# Patient Record
Sex: FEMALE | Race: WHITE | NOT HISPANIC OR LATINO | URBAN - METROPOLITAN AREA
[De-identification: names, ages, dates, MRNs, and addresses within clinical notes are randomized per-mention and may not be internally consistent; named-entity substitution may affect disease eponyms.]

---

## 2019-04-29 ENCOUNTER — TRANSFERRED RECORDS (OUTPATIENT)
Dept: HEALTH INFORMATION MANAGEMENT | Facility: CLINIC | Age: 74
End: 2019-04-29

## 2019-05-16 ENCOUNTER — TRANSFERRED RECORDS (OUTPATIENT)
Dept: HEALTH INFORMATION MANAGEMENT | Facility: CLINIC | Age: 74
End: 2019-05-16

## 2019-05-24 ENCOUNTER — TRANSFERRED RECORDS (OUTPATIENT)
Dept: HEALTH INFORMATION MANAGEMENT | Facility: CLINIC | Age: 74
End: 2019-05-24

## 2019-06-06 ENCOUNTER — TRANSFERRED RECORDS (OUTPATIENT)
Dept: HEALTH INFORMATION MANAGEMENT | Facility: CLINIC | Age: 74
End: 2019-06-06

## 2019-07-08 ENCOUNTER — TRANSFERRED RECORDS (OUTPATIENT)
Dept: HEALTH INFORMATION MANAGEMENT | Facility: CLINIC | Age: 74
End: 2019-07-08

## 2019-07-25 ENCOUNTER — OFFICE VISIT (OUTPATIENT)
Dept: RADIATION THERAPY | Facility: OUTPATIENT CENTER | Age: 74
End: 2019-07-25
Payer: COMMERCIAL

## 2019-07-25 VITALS
HEART RATE: 61 BPM | DIASTOLIC BLOOD PRESSURE: 78 MMHG | RESPIRATION RATE: 16 BRPM | SYSTOLIC BLOOD PRESSURE: 161 MMHG | OXYGEN SATURATION: 100 % | WEIGHT: 181.6 LBS

## 2019-07-25 DIAGNOSIS — D05.11 DUCTAL CARCINOMA IN SITU (DCIS) OF RIGHT BREAST: Primary | ICD-10-CM

## 2019-07-25 PROBLEM — R76.11 MANTOUX: POSITIVE: Status: ACTIVE | Noted: 2019-07-25

## 2019-07-25 PROBLEM — I10 HYPERTENSION: Status: ACTIVE | Noted: 2019-07-25

## 2019-07-25 PROBLEM — M17.12 PRIMARY OSTEOARTHRITIS OF LEFT KNEE: Status: ACTIVE | Noted: 2018-06-05

## 2019-07-25 PROBLEM — R01.1 SYSTOLIC MURMUR: Status: ACTIVE | Noted: 2017-02-14

## 2019-07-25 PROBLEM — M54.17 RIGHT LUMBOSACRAL RADICULOPATHY: Status: ACTIVE | Noted: 2018-11-07

## 2019-07-25 PROBLEM — M77.8 THUMB TENDONITIS: Status: ACTIVE | Noted: 2018-04-26

## 2019-07-25 RX ORDER — CALCIPOTRIENE 50 UG/G
CREAM TOPICAL
COMMUNITY
Start: 2012-07-30

## 2019-07-25 RX ORDER — ANASTROZOLE 1 MG/1
TABLET ORAL DAILY
COMMUNITY
Start: 2018-10-23

## 2019-07-25 RX ORDER — LOSARTAN POTASSIUM 50 MG/1
50 TABLET ORAL DAILY
COMMUNITY
Start: 2018-11-19

## 2019-07-25 ASSESSMENT — PAIN SCALES - GENERAL: PAINLEVEL: NO PAIN (0)

## 2019-07-25 NOTE — NURSING NOTE
REASON FOR APPOINTMENT   Newly diagnosed right breast DCIS. S/p lumpectomy. No chemotherapy indicated.  Already on arimidex for left breast cancer treatment in 2017. See [facility]/care everywhere.    PERSONAL HISTORY OF CANCER   Previous Cancer ? Left breast cancer tx 2017: Lumpectomy + RT + arimidex   Prior Radiation ? 21 fractions performed at [place] in [place] 2017. Records received.     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 Yes    REVIEW OF SYSTEMS  Skin: breast incision healing  Eyes: glasses  Ears/Nose/Throat: negative  Respiratory: No shortness of breath, dyspnea on exertion, cough, or hemoptysis  Cardiovascular: negative  Gastrointestinal: negative  Genitourinary: incontinence, nocturia  Musculoskeletal: left knee pain/bakers cyst acts up at times  Neurologic: negative  Psychiatric: negative  Hematologic/Lymphatic/Immunologic: negative  Endocrine: negative    WOMEN ONLY  Any chance you may be pregnant: No    Radiation Oncology Patient Teaching    Person involved with teaching: Patient  Patient asked Questions: Yes  Patient was cooperative: Yes  Patient was receptive (willing to accept information given): Yes    Education Assessment  Comprehension ability: High  Knowledge level: High  Factors affecting teaching: None    Education Materials Given  Radiation Therapy and You  Caring for Your Skin During Radiation ...  Eating Hints  Diet and Nutrition Information    Educational Topics Discussed  Side effects, Medications, Activity and Nutrition    Response To Teaching  Verbalizes understanding    GYN Only  Vaginal Dilator-given and educated:  N/A    Do you have an advanced directive or living will? yes  Are you DNR/DNI? no

## 2019-07-25 NOTE — PROGRESS NOTES
Department of Radiation Oncology  Radiation Therapy Center  [facility]  [address], [address]  [place] [address]  [phone]       Consultation Note    Name: Arlin Hutchins MRN: [id number]   : 1945   Date of Service: 2019  Referring: Dr. Moody      Reason for consultation: History of left breast ductal carcinoma in situ status post breast conservation therapy now presenting with right breast ductal carcinoma in situ status post lumpectomy.  Final pathology demonstrated DCIS, grade 3, 2.5 cm in size, close anterior and medial margin (1 mm), ER negative, pathologic TisN0.  Evaluate role for adjuvant radiation therapy.    History of Present Illness   Ms. Hutchins is a 73 year old female a diagnosis of left breast ductal carcinoma in situ status post breast conservation therapy now presenting with right breast ductal carcinoma in situ status post lumpectomy.  Final pathology demonstrated DCIS, grade 3, 2.5 cm in size, close anterior and medial margin (1 mm), ER negative, pathologic TisN0.  She presents today to discuss potential role of adjuvant whole breast radiation therapy.    The patient's history dates to  when she underwent lumpectomy of the left breast diagnosed ductal carcinoma in situ.  On 2017 final path from left breast lumpectomy demonstrated DCIS, grade 2, negative margins, no invasive disease, estrogen receptor positive.  The patient subsequent completed adjuvant whole breast radiation therapy under the care of Dr. Schumacher.  She received a total dose of 4256 cGy followed by 1000 cGy boost to the lumpectomy bed.  Treatment was completed on 2017.  The patient was seen by Dr. Monaco of medical oncology who continued anti-endocrine therapy with Arimidex.  More recently the patient underwent mammogram which demonstrated calcifications in the right breast prompting further evaluation.  On 2019 left breast biopsy of the area of calcifications at the  12 o'clock position, 5 cm from the nipple demonstrated DCIS, grade 3, ER negative.  Biopsy of an additional site at the 12 o'clock position, 9 cm from the nipple demonstrated DCIS grade 3, no invasive disease.  On 6/27/2019 the patient underwent right breast lumpectomy by Dr. Moody.  Final pathology demonstrated DCIS, grade 3, 2.5 cm in size, close anterior/medial margin (1 mm), ER negative, no invasive disease, pathologic Tis N0.  Reeexcision was discuss, but ultimately the patient chose not to undergo re-excision. The patient subsequently presents today to discuss the potential role of whole breast radiation therapy.      She is overall doing well.  She has healed well from surgery.  No issues with range of motion.  No issues with lymphedema.  No pacemaker.  She does have a previous history of left breast radiation as described.  No history of connective tissue disorder.  The patient states that she overall tolerated her previous course of adjuvant whole breast radiation very well.  She has continued on Arimidex under the care of Dr. Monaco.      Past Medical History:   Past Medical History:   Diagnosis Date     Cancer (H)      Hypertension        Past Surgical History:   Past Surgical History:   Procedure Laterality Date     AS TOTAL ABDOM HYSTERECTOMY      SANDY/BSO     BREAST SURGERY      left lumpectomy 2017     LITHOTRIPSY       TONSILLECTOMY         Chemotherapy History:  none    Radiation History:  3/22/17 to 4/13/17: Left whole breast radiation therapy. 4256 cGy in 16 fractions followed by 1000 cGy in 5 fraction boost. Dr. Schumacher.     Pregnant: No  Implanted Cardiac Devices: No    Medications:  Current Outpatient Medications   Medication     anastrozole (ARIMIDEX) 1 MG tablet     calcipotriene (DOVONEX) 0.005 % external cream     hydrocortisone (ANUSOL-HC) 2.5 % cream     losartan (COZAAR) 50 MG tablet     No current facility-administered medications for this visit.          Allergies:     Allergies    Allergen Reactions     Penicillins Anaphylaxis     Throat closes     Diatrizoate Hives     Hydrochlorothiazide Other (See Comments)     Severe headache on 25mg tab hydrochlorothiazide      Shellfish Allergy Nausea     Clams only     Sulfa Drugs Rash         Family History:  No family history on file.    Review of Systems   A 10-point review of systems was performed. Pertinent findings are noted in the HPI.    Physical Exam   ECOG Status: 1    Vitals:  There were no vitals taken for this visit.    Gen: Alert, in NAD  Head: NC/AT  Eyes: PERRL, EOMI, sclera anicteric  Ears: No external auricular lesions  Nose/sinus: No rhinorrhea or epistaxis  Oral cavity/oropharynx: MMM, no visible oral cavity lesions, FOM and BOT are soft to palpation  Neck: Full ROM, supple, no palpable adenopathy  Pulm: No wheezing, stridor or respiratory distress  CV: Extremities are warm and well-perfused, no cyanosis, no pedal edema  Abdominal: Normal bowel sounds, soft, nontender, no masses  Musculoskeletal: Normal bulk and tone  Skin: Normal color and turgor  Neuro: A/Ox3, CN II-XII intact, normal gait    Imaging/Path/Labs   Imaging:   Per HPI    Path:   2/27/17  Specimens:   A) - Left Breast Lump, Left breast lump, Blue is anterior , Orange is superior                      B) - Left Breast Lump, Medial margin,Green is new margin                                            C) - Left Breast Lump, New posterior margin                                               Final Diagnosis A) LEFT BREAST, WIRE LOCALIZATION LUMPECTOMY WITH MARGINS EVALUATION:  1. Ductal carcinoma in situ (DCIS), cribriform type, nuclear grade 2     a. DCIS is present in a single duct (measuring less than 0.2 cm); see comment     b. DCIS is located 0.2 cm from the closest medial margin (for final        Medial margin see specimen B)  2. Prior core biopsy site changes   3. Portion of benign skin   4. Negative for invasive carcinoma    B) LEFT BREAST, NEW MEDIAL MARGIN,  EXCISION:  1. Proliferative fibrocystic change including intraductal papillomas  2. Negative for invasive carcinoma    C) LEFT BREAST, NEW POSTERIOR MARGIN, EXCISION:  1. Atypical lobular hyperplasia    2. Proliferative fibrocystic change including intraductal papillomas  3. Negative for invasive carcinoma           6/27/19  A) RIGHT BREAST, LUMPECTOMY:   1. Ductal carcinoma in situ (DCIS), solid and cribriform type, nuclear grade 3, with punctate necrosis    a. DCIS measures 2.5 cm (present in 5 of 11 consecutive tissue slices)   b. DCIS is focally located 0.1 cm from anterior/medial margin    c. Previous core biopsy site changes (2 biopsy clips identified)  2. Breast Ancillary Testing: Performed on prior case ([id number])      a. Estrogen receptor: Negative (by manual morphometry)   3. Negative invasive carcinoma        Assessment    Ms. Hutchins is a 73 year old female a diagnosis of left breast ductal carcinoma in situ status post breast conservation therapy now presenting with right breast ductal carcinoma in situ status post lumpectomy.  Final pathology demonstrated DCIS, grade 3, 2.5 cm in size, close anterior and medial margin (1 mm), ER negative, pathologic TisN0.  She presents today to discuss potential role of adjuvant right whole breast radiation therapy.    Plan   Today, we discussed that in general, adjuvant whole breast radiotherapy is recommended as it has been demonstrated to reduce tumor recurrence by 50%, which has been demonstrated in a  meta-analysis of DCIS patients treated with radiation over lumpectomy alone, despite no survival benefit  detected (EBCTCG, JNCI, 2010).  However, we also discussed that there is clinical evidence suggesting that highly selected patients with small, low to intermediate grade DCIS lesions can be offered observation after lumpectomy rather than radiation. In a prospective, randomized study by the ECOG, patients with low/intermediate grade DCIS, median tumor size 6mm,  resected with margins >3mm had ipsilateral breast tumor recurrence rates of 6% at 5 years (Miguel, JCO, 2009). This is approximately a 1% recurrence rate per year. Another study RTOG 9804, also showed recurrence rates of approximately 1% per year without radiation in patients who had low risk DCIS (Jaclyn et al, JCO, 2015).  Though radiation did significantly reduce recurrence rates (greater than 50% relative reduction), the absolute benefit was small, decreasing from 6.4% to 0.9% at 7 years.      However, we discussed that Mrs. Hutchins would likely have a higher risk of LR given her high grade histology, which confers about a 3% risk per year of local recurrence. Additionally, she has a close surgical margin anteriorly and medially. Given these findings, I would recommend adjuvant whole breast radiation therapy. I would plan to treat to a [place] dose [place] 55962 cGy in 15 fractions followed by 1000 cGy in 5 fraction boost to the lumpectomy bed.     Today we also discussed the logistics of treatment planning and delivery in detail with the patient. We also discussed side effects, including short term risks of fatigue and skin reaction, and long term risks of pneumonitis, lung fibrosis, soft tissue fibrosis, skin changes, breast contour changes, rib fractures, cardiac damage, secondary cancers, lymphedema, and thyroid dysfunction. We described the use of 3D-conformal radiotherapy to minimize dose to the normal tissues, while adequately covering the target tissues, and the ability of this technique to decrease potential for toxicity.    The patient has elected to pursue radiation therapy. Consent was obtained. We will schedule the patient for CT simulation with plan to begin treatment about 7-10 days thereafter.     Scotty Lenz MD  Department of Radiation Oncology  [facility]

## 2019-07-25 NOTE — LETTER
2019      RE: Arlin Hutchins  [address] [address] [address]          Department of Radiation Oncology  Radiation Therapy Center  [facility]  [address], [address]  [place] [address]  [phone]       Consultation Note    Name: Arlin Hutchins MRN: [id number]   : 1945   Date of Service: 2019  Referring: Dr. Moody      Reason for consultation: History of left breast ductal carcinoma in situ status post breast conservation therapy now presenting with right breast ductal carcinoma in situ status post lumpectomy.  Final pathology demonstrated DCIS, grade 3, 2.5 cm in size, close anterior and medial margin (1 mm), ER negative, pathologic TisN0.  Evaluate role for adjuvant radiation therapy.    History of Present Illness   Ms. Hutchins is a 73 year old female a diagnosis of left breast ductal carcinoma in situ status post breast conservation therapy now presenting with right breast ductal carcinoma in situ status post lumpectomy.  Final pathology demonstrated DCIS, grade 3, 2.5 cm in size, close anterior and medial margin (1 mm), ER negative, pathologic TisN0.  She presents today to discuss potential role of adjuvant whole breast radiation therapy.    The patient's history dates to  when she underwent lumpectomy of the left breast diagnosed ductal carcinoma in situ.  On 2017 final path from left breast lumpectomy demonstrated DCIS, grade 2, negative margins, no invasive disease, estrogen receptor positive.  The patient subsequent completed adjuvant whole breast radiation therapy under the care of Dr. Schumacher.  She received a total dose of 4256 cGy followed by 1000 cGy boost to the lumpectomy bed.  Treatment was completed on 2017.  The patient was seen by Dr. Monaco of medical oncology who continued anti-endocrine therapy with Arimidex.  More recently the patient underwent mammogram which demonstrated calcifications in the right breast prompting further  evaluation.  On 5/16/2019 left breast biopsy of the area of calcifications at the 12 o'clock position, 5 cm from the nipple demonstrated DCIS, grade 3, ER negative.  Biopsy of an additional site at the 12 o'clock position, 9 cm from the nipple demonstrated DCIS grade 3, no invasive disease.  On 6/27/2019 the patient underwent right breast lumpectomy by Dr. Moody.  Final pathology demonstrated DCIS, grade 3, 2.5 cm in size, close anterior/medial margin (1 mm), ER negative, no invasive disease, pathologic Tis N0.  Reeexcision was discuss, but ultimately the patient chose not to undergo re-excision. The patient subsequently presents today to discuss the potential role of whole breast radiation therapy.      She is overall doing well.  She has healed well from surgery.  No issues with range of motion.  No issues with lymphedema.  No pacemaker.  She does have a previous history of left breast radiation as described.  No history of connective tissue disorder.  The patient states that she overall tolerated her previous course of adjuvant whole breast radiation very well.  She has continued on Arimidex under the care of Dr. Monaco.      Past Medical History:   Past Medical History:   Diagnosis Date     Cancer (H)      Hypertension        Past Surgical History:   Past Surgical History:   Procedure Laterality Date     AS TOTAL ABDOM HYSTERECTOMY      SANDY/BSO     BREAST SURGERY      left lumpectomy 2017     LITHOTRIPSY       TONSILLECTOMY         Chemotherapy History:  none    Radiation History:  3/22/17 to 4/13/17: Left whole breast radiation therapy. 4256 cGy in 16 fractions followed by 1000 cGy in 5 fraction boost. Dr. Schumacher.     Pregnant: No  Implanted Cardiac Devices: No    Medications:  Current Outpatient Medications   Medication     anastrozole (ARIMIDEX) 1 MG tablet     calcipotriene (DOVONEX) 0.005 % external cream     hydrocortisone (ANUSOL-HC) 2.5 % cream     losartan (COZAAR) 50 MG tablet     No current  facility-administered medications for this visit.          Allergies:     Allergies   Allergen Reactions     Penicillins Anaphylaxis     Throat closes     Diatrizoate Hives     Hydrochlorothiazide Other (See Comments)     Severe headache on 25mg tab hydrochlorothiazide      Shellfish Allergy Nausea     Clams only     Sulfa Drugs Rash         Family History:  No family history on file.    Review of Systems   A 10-point review of systems was performed. Pertinent findings are noted in the HPI.    Physical Exam   ECOG Status: 1    Vitals:  There were no vitals taken for this visit.    Gen: Alert, in NAD  Head: NC/AT  Eyes: PERRL, EOMI, sclera anicteric  Ears: No external auricular lesions  Nose/sinus: No rhinorrhea or epistaxis  Oral cavity/oropharynx: MMM, no visible oral cavity lesions, FOM and BOT are soft to palpation  Neck: Full ROM, supple, no palpable adenopathy  Pulm: No wheezing, stridor or respiratory distress  CV: Extremities are warm and well-perfused, no cyanosis, no pedal edema  Abdominal: Normal bowel sounds, soft, nontender, no masses  Musculoskeletal: Normal bulk and tone  Skin: Normal color and turgor  Neuro: A/Ox3, CN II-XII intact, normal gait    Imaging/Path/Labs   Imaging:   Per HPI    Path:   2/27/17  Specimens:   A) - Left Breast Lump, Left breast lump, Blue is anterior , Orange is superior                      B) - Left Breast Lump, Medial margin,Green is new margin                                            C) - Left Breast Lump, New posterior margin                                               Final Diagnosis A) LEFT BREAST, WIRE LOCALIZATION LUMPECTOMY WITH MARGINS EVALUATION:  1. Ductal carcinoma in situ (DCIS), cribriform type, nuclear grade 2     a. DCIS is present in a single duct (measuring less than 0.2 cm); see comment     b. DCIS is located 0.2 cm from the closest medial margin (for final        Medial margin see specimen B)  2. Prior core biopsy site changes   3. Portion of benign  skin   4. Negative for invasive carcinoma    B) LEFT BREAST, NEW MEDIAL MARGIN, EXCISION:  1. Proliferative fibrocystic change including intraductal papillomas  2. Negative for invasive carcinoma    C) LEFT BREAST, NEW POSTERIOR MARGIN, EXCISION:  1. Atypical lobular hyperplasia    2. Proliferative fibrocystic change including intraductal papillomas  3. Negative for invasive carcinoma           6/27/19  A) RIGHT BREAST, LUMPECTOMY:   1. Ductal carcinoma in situ (DCIS), solid and cribriform type, nuclear grade 3, with punctate necrosis    a. DCIS measures 2.5 cm (present in 5 of 11 consecutive tissue slices)   b. DCIS is focally located 0.1 cm from anterior/medial margin    c. Previous core biopsy site changes (2 biopsy clips identified)  2. Breast Ancillary Testing: Performed on prior case ([id number])      a. Estrogen receptor: Negative (by manual morphometry)   3. Negative invasive carcinoma        Assessment    Ms. Hutchins is a 73 year old female a diagnosis of left breast ductal carcinoma in situ status post breast conservation therapy now presenting with right breast ductal carcinoma in situ status post lumpectomy.  Final pathology demonstrated DCIS, grade 3, 2.5 cm in size, close anterior and medial margin (1 mm), ER negative, pathologic TisN0.  She presents today to discuss potential role of adjuvant right whole breast radiation therapy.    Plan   Today, we discussed that in general, adjuvant whole breast radiotherapy is recommended as it has been demonstrated to reduce tumor recurrence by 50%, which has been demonstrated in a  meta-analysis of DCIS patients treated with radiation over lumpectomy alone, despite no survival benefit  detected (EBCTCG, JNCI, 2010).  However, we also discussed that there is clinical evidence suggesting that highly selected patients with small, low to intermediate grade DCIS lesions can be offered observation after lumpectomy rather than radiation. In a prospective, randomized study  by the ECOG, patients with low/intermediate grade DCIS, median tumor size 6mm, resected with margins >3mm had ipsilateral breast tumor recurrence rates of 6% at 5 years (Miguel, JCO, 2009). This is approximately a 1% recurrence rate per year. Another study RTOG 9804, also showed recurrence rates of approximately 1% per year without radiation in patients who had low risk DCIS (Jaclyn et al, JCO, 2015).  Though radiation did significantly reduce recurrence rates (greater than 50% relative reduction), the absolute benefit was small, decreasing from 6.4% to 0.9% at 7 years.      However, we discussed that Mrs. Hutchins would likely have a higher risk of LR given her high grade histology, which confers about a 3% risk per year of local recurrence. Additionally, she has a close surgical margin anteriorly and medially. Given these findings, I would recommend adjuvant whole breast radiation therapy. I would plan to treat to a [place] 31233 cGy in 15 fractions followed by 1000 cGy in 5 fraction boost to the lumpectomy bed.     Today we also discussed the logistics of treatment planning and delivery in detail with the patient. We also discussed side effects, including short term risks of fatigue and skin reaction, and long term risks of pneumonitis, lung fibrosis, soft tissue fibrosis, skin changes, breast contour changes, rib fractures, cardiac damage, secondary cancers, lymphedema, and thyroid dysfunction. We described the use of 3D-conformal radiotherapy to minimize dose to the normal tissues, while adequately covering the target tissues, and the ability of this technique to decrease potential for toxicity.    The patient has elected to pursue radiation therapy. Consent was obtained. We will schedule the patient for CT simulation with plan to begin treatment about 7-10 days thereafter.     Scotty Lenz MD  Department of Radiation Oncology  [facility]

## 2019-08-01 ENCOUNTER — APPOINTMENT (OUTPATIENT)
Dept: RADIATION THERAPY | Facility: OUTPATIENT CENTER | Age: 74
End: 2019-08-01
Payer: COMMERCIAL

## 2019-08-08 ENCOUNTER — APPOINTMENT (OUTPATIENT)
Dept: RADIATION THERAPY | Facility: OUTPATIENT CENTER | Age: 74
End: 2019-08-08
Payer: COMMERCIAL

## 2019-08-09 ENCOUNTER — APPOINTMENT (OUTPATIENT)
Dept: RADIATION THERAPY | Facility: OUTPATIENT CENTER | Age: 74
End: 2019-08-09
Payer: COMMERCIAL

## 2019-08-12 ENCOUNTER — APPOINTMENT (OUTPATIENT)
Dept: RADIATION THERAPY | Facility: OUTPATIENT CENTER | Age: 74
End: 2019-08-12
Payer: COMMERCIAL

## 2019-08-13 ENCOUNTER — APPOINTMENT (OUTPATIENT)
Dept: RADIATION THERAPY | Facility: OUTPATIENT CENTER | Age: 74
End: 2019-08-13
Payer: COMMERCIAL

## 2019-08-14 ENCOUNTER — OFFICE VISIT (OUTPATIENT)
Dept: RADIATION THERAPY | Facility: OUTPATIENT CENTER | Age: 74
End: 2019-08-14
Payer: COMMERCIAL

## 2019-08-14 ENCOUNTER — APPOINTMENT (OUTPATIENT)
Dept: RADIATION THERAPY | Facility: OUTPATIENT CENTER | Age: 74
End: 2019-08-14
Payer: COMMERCIAL

## 2019-08-14 VITALS
RESPIRATION RATE: 18 BRPM | WEIGHT: 179.4 LBS | SYSTOLIC BLOOD PRESSURE: 145 MMHG | HEART RATE: 59 BPM | OXYGEN SATURATION: 99 % | DIASTOLIC BLOOD PRESSURE: 72 MMHG

## 2019-08-14 DIAGNOSIS — D05.11 DUCTAL CARCINOMA IN SITU (DCIS) OF RIGHT BREAST: Primary | ICD-10-CM

## 2019-08-14 ASSESSMENT — PAIN SCALES - GENERAL: PAINLEVEL: NO PAIN (0)

## 2019-08-14 NOTE — LETTER
2019      RE: Arlin Hutchins  [address] [address] [address]       [facility]  SPECIALIZING IN BREAKTHROUGHS  Radiation Oncology    On Treatment Visit Note      Arlin Hutchins      Date: 2019   MRN: [id number]   : 1945         Reason for Visit:  On Radiation Treatment Visit     Treatment Summary to Date   Right breast   1335/ 5005 cGy             Subjective:   Doing well. No acute complaints. Using skin creams. No breast pain. No pruritus.     Nursing ROS:           Objective:   There were no vitals taken for this visit.   No skin erythema or desquamation.     Labs:  CBC RESULTS: No results for input(s): WBC, RBC, HGB, HCT, MCV, MCH, MCHC, RDW, PLT in the last 41271 hours.  ELECTROLYTES:  No results for input(s): NA, POTASSIUM, CHLORIDE, WILY, CO2, BUN, CR, GLC in the last 79354 hours.    Assessment:  Ms. Hutchins is a 73 year old female a diagnosis of left breast ductal carcinoma in situ status post breast conservation therapy now presenting with right breast ductal carcinoma in situ status post lumpectomy.  Final pathology demonstrated DCIS, grade 3, 2.5 cm in size, close anterior and medial margin (1 mm), ER negative, pathologic TisN0.  She  is undergoing adjuvant right whole breast radiation therapy.    Tolerating radiation therapy well.  All questions and concerns addressed.    Plan:   1. Continue current therapy.    2. Continue skin care.   3. Ibuprofen PRN beast heaviness/pain. Neosporin with pain relief PRN pain. Hydrocortisone PRN pruritus.       Mosaiq chart and setup information reviewed  Ports checked         Scotty Lenz MD

## 2019-08-14 NOTE — PROGRESS NOTES
[facility] PHYSICIANS  SPECIALIZING IN BREAKTHROUGHS  Radiation Oncology    On Treatment Visit Note      Arlin Hutchins      Date: 2019   MRN: [id number]   : 1945         Reason for Visit:  On Radiation Treatment Visit     Treatment Summary to Date   Right breast   1335/ 5005 cGy             Subjective:   Doing well. No acute complaints. Using skin creams. No breast pain. No pruritus.     Nursing ROS:           Objective:   There were no vitals taken for this visit.   No skin erythema or desquamation.     Labs:  CBC RESULTS: No results for input(s): WBC, RBC, HGB, HCT, MCV, MCH, MCHC, RDW, PLT in the last 74835 hours.  ELECTROLYTES:  No results for input(s): NA, POTASSIUM, CHLORIDE, WILY, CO2, BUN, CR, GLC in the last 39197 hours.    Assessment:  Ms. Hutchins is a 73 year old female a diagnosis of left breast ductal carcinoma in situ status post breast conservation therapy now presenting with right breast ductal carcinoma in situ status post lumpectomy.  Final pathology demonstrated DCIS, grade 3, 2.5 cm in size, close anterior and medial margin (1 mm), ER negative, pathologic TisN0.  She is undergoing adjuvant right whole breast radiation therapy.    Tolerating radiation therapy well.  All questions and concerns addressed.    Plan:   1. Continue current therapy.    2. Continue skin care.   3. Ibuprofen PRN beast heaviness/pain. Neosporin with pain relief PRN pain. Hydrocortisone PRN pruritus.       drchrono chart and setup information reviewed  Ports checked                Scotty Lenz MD

## 2019-08-15 ENCOUNTER — APPOINTMENT (OUTPATIENT)
Dept: RADIATION THERAPY | Facility: OUTPATIENT CENTER | Age: 74
End: 2019-08-15
Payer: COMMERCIAL

## 2019-08-16 ENCOUNTER — APPOINTMENT (OUTPATIENT)
Dept: RADIATION THERAPY | Facility: OUTPATIENT CENTER | Age: 74
End: 2019-08-16
Payer: COMMERCIAL

## 2019-08-20 ENCOUNTER — APPOINTMENT (OUTPATIENT)
Dept: RADIATION THERAPY | Facility: OUTPATIENT CENTER | Age: 74
End: 2019-08-20
Payer: COMMERCIAL

## 2019-08-21 ENCOUNTER — OFFICE VISIT (OUTPATIENT)
Dept: RADIATION THERAPY | Facility: OUTPATIENT CENTER | Age: 74
End: 2019-08-21
Payer: COMMERCIAL

## 2019-08-21 ENCOUNTER — APPOINTMENT (OUTPATIENT)
Dept: RADIATION THERAPY | Facility: OUTPATIENT CENTER | Age: 74
End: 2019-08-21
Payer: COMMERCIAL

## 2019-08-21 VITALS
DIASTOLIC BLOOD PRESSURE: 78 MMHG | SYSTOLIC BLOOD PRESSURE: 168 MMHG | OXYGEN SATURATION: 98 % | HEART RATE: 68 BPM | RESPIRATION RATE: 18 BRPM | WEIGHT: 180.6 LBS

## 2019-08-21 DIAGNOSIS — D05.11 DUCTAL CARCINOMA IN SITU (DCIS) OF RIGHT BREAST: Primary | ICD-10-CM

## 2019-08-21 NOTE — PROGRESS NOTES
[facility] PHYSICIANS  SPECIALIZING IN BREAKTHROUGHS  Radiation Oncology    On Treatment Visit Note      Arlin Hutchins      Date: 2019   MRN: [id number]   : 1945         Reason for Visit:  On Radiation Treatment Visit     Treatment Summary to Date   Right breast   2403/ 5005 cGy             Subjective:   Doing well. No acute complaints. Using skin creams. No breast pain. No pruritus.     Nursing ROS:           Objective:   There were no vitals taken for this visit.   Very mild skin erythema without desquamation.     Labs:  CBC RESULTS: No results for input(s): WBC, RBC, HGB, HCT, MCV, MCH, MCHC, RDW, PLT in the last 59314 hours.  ELECTROLYTES:  No results for input(s): NA, POTASSIUM, CHLORIDE, WILY, CO2, BUN, CR, GLC in the last 63119 hours.    Assessment:  Ms. Hutchins is a 73 year old female a diagnosis of left breast ductal carcinoma in situ status post breast conservation therapy now presenting with right breast ductal carcinoma in situ status post lumpectomy.  Final pathology demonstrated DCIS, grade 3, 2.5 cm in size, close anterior and medial margin (1 mm), ER negative, pathologic TisN0.  She is undergoing adjuvant right whole breast radiation therapy.    Tolerating radiation therapy well.  All questions and concerns addressed.    Plan:   1. Continue current therapy.    2. Continue skin care.   3. Ibuprofen PRN beast heaviness/pain. Neosporin with pain relief PRN pain. Hydrocortisone PRN pruritus.       Mosaiq chart and setup information reviewed  Ports checked                Scotty Lenz MD

## 2019-08-21 NOTE — LETTER
2019      RE: Arlin Hutchins  [address] [address] [address]       [facility] PHYSICIANS  SPECIALIZING IN BREAKTHROUGHS  Radiation Oncology    On Treatment Visit Note      Arlin Hutchins      Date: 2019   MRN: [id number]   : 1945         Reason for Visit:  On Radiation Treatment Visit     Treatment Summary to Date   Right breast   2403/ 5005 cGy             Subjective:   Doing well. No acute complaints. Using skin creams. No breast pain. No pruritus.     Nursing ROS:           Objective:   There were no vitals taken for this visit.   Very mild skin erythema without desquamation.     Labs:  CBC RESULTS: No results for input(s): WBC, RBC, HGB, HCT, MCV, MCH, MCHC, RDW, PLT in the last 71028 hours.  ELECTROLYTES:  No results for input(s): NA, POTASSIUM, CHLORIDE, WILY, CO2, BUN, CR, GLC in the last 89703 hours.    Assessment:  Ms. Hutchins is a 73 year old female a diagnosis of left breast ductal carcinoma in situ status post breast conservation therapy now presenting with right breast ductal carcinoma in situ status post lumpectomy.  Final pathology demonstrated DCIS, grade 3, 2.5 cm in size, close anterior and medial margin (1 mm), ER negative, pathologic TisN0.  She is undergoing adjuvant right whole breast radiation therapy.    Tolerating radiation therapy well.  All questions and concerns addressed.    Plan:   1. Continue current therapy.    2. Continue skin care.   3. Ibuprofen PRN beast heaviness/pain. Neosporin with pain relief PRN pain. Hydrocortisone PRN pruritus.       Mosaiq chart and setup information reviewed  Ports checked                MD Scotty Gomez MD

## 2019-08-22 ENCOUNTER — APPOINTMENT (OUTPATIENT)
Dept: RADIATION THERAPY | Facility: OUTPATIENT CENTER | Age: 74
End: 2019-08-22
Payer: COMMERCIAL

## 2019-08-23 ENCOUNTER — APPOINTMENT (OUTPATIENT)
Dept: RADIATION THERAPY | Facility: OUTPATIENT CENTER | Age: 74
End: 2019-08-23
Payer: COMMERCIAL

## 2019-08-26 ENCOUNTER — APPOINTMENT (OUTPATIENT)
Dept: RADIATION THERAPY | Facility: OUTPATIENT CENTER | Age: 74
End: 2019-08-26
Payer: COMMERCIAL

## 2019-08-27 ENCOUNTER — APPOINTMENT (OUTPATIENT)
Dept: RADIATION THERAPY | Facility: OUTPATIENT CENTER | Age: 74
End: 2019-08-27
Payer: COMMERCIAL

## 2019-08-28 ENCOUNTER — OFFICE VISIT (OUTPATIENT)
Dept: RADIATION THERAPY | Facility: OUTPATIENT CENTER | Age: 74
End: 2019-08-28
Payer: COMMERCIAL

## 2019-08-28 ENCOUNTER — APPOINTMENT (OUTPATIENT)
Dept: RADIATION THERAPY | Facility: OUTPATIENT CENTER | Age: 74
End: 2019-08-28
Payer: COMMERCIAL

## 2019-08-28 VITALS — SYSTOLIC BLOOD PRESSURE: 133 MMHG | DIASTOLIC BLOOD PRESSURE: 79 MMHG | WEIGHT: 178.6 LBS | HEART RATE: 77 BPM

## 2019-08-28 DIAGNOSIS — D05.11 DUCTAL CARCINOMA IN SITU (DCIS) OF RIGHT BREAST: Primary | ICD-10-CM

## 2019-08-28 NOTE — LETTER
2019      RE: Arlin Hutchins  [address] [address] [address]       [facility] PHYSICIANS  SPECIALIZING IN BREAKTHROUGHS  Radiation Oncology    On Treatment Visit Note      Arlin Hutchins      Date: 2019   MRN: [id number]   : 1945         Reason for Visit:  On Radiation Treatment Visit     Treatment Summary to Date   Right breast   3738/ 5005 cGy             Subjective:   Doing well. No acute complaints. Using skin creams. Single episode of breast pain yesterday, since resolved. No pruritus.     Nursing ROS:           Objective:   There were no vitals taken for this visit.   Very mild skin erythema without desquamation.     Labs:  CBC RESULTS: No results for input(s): WBC, RBC, HGB, HCT, MCV, MCH, MCHC, RDW, PLT in the last 00565 hours.  ELECTROLYTES:  No results for input(s): NA, POTASSIUM, CHLORIDE, WILY, CO2, BUN, CR, GLC in the last 83067 hours.    Assessment:  Ms. Hutchins is a 73 year old female a diagnosis of left breast ductal carcinoma in situ status post breast conservation therapy now presenting with right breast ductal carcinoma in situ status post lumpectomy.  Final pathology demonstrated DCIS, grade 3, 2.5 cm in size, close anterior and medial margin (1 mm), ER negative, pathologic TisN0.  She is undergoing adjuvant right whole breast radiation therapy.    Tolerating radiation therapy well.  All questions and concerns addressed.    Plan:   1. Continue current therapy.    2. Continue skin care.   3. Ibuprofen PRN beast heaviness/pain. Neosporin with pain relief PRN pain. Hydrocortisone PRN pruritus.       mGenerator chart and setup information reviewed  Ports checked            Scotty Lenz MD

## 2019-08-28 NOTE — PROGRESS NOTES
[facility] PHYSICIANS  SPECIALIZING IN BREAKTHROUGHS  Radiation Oncology    On Treatment Visit Note      Arlin Hutchins      Date: 2019   MRN: [id number]   : 1945         Reason for Visit:  On Radiation Treatment Visit     Treatment Summary to Date   Right breast   3738/ 5005 cGy             Subjective:   Doing well. No acute complaints. Using skin creams. Single episode of breast pain yesterday, since resolved. No pruritus.     Nursing ROS:           Objective:   There were no vitals taken for this visit.   Very mild skin erythema without desquamation.     Labs:  CBC RESULTS: No results for input(s): WBC, RBC, HGB, HCT, MCV, MCH, MCHC, RDW, PLT in the last 62123 hours.  ELECTROLYTES:  No results for input(s): NA, POTASSIUM, CHLORIDE, WLIY, CO2, BUN, CR, GLC in the last 08923 hours.    Assessment:  Ms. Hutchins is a 73 year old female a diagnosis of left breast ductal carcinoma in situ status post breast conservation therapy now presenting with right breast ductal carcinoma in situ status post lumpectomy.  Final pathology demonstrated DCIS, grade 3, 2.5 cm in size, close anterior and medial margin (1 mm), ER negative, pathologic TisN0.  She is undergoing adjuvant right whole breast radiation therapy.    Tolerating radiation therapy well.  All questions and concerns addressed.    Plan:   1. Continue current therapy.    2. Continue skin care.   3. Ibuprofen PRN beast heaviness/pain. Neosporin with pain relief PRN pain. Hydrocortisone PRN pruritus.       Mosaiq chart and setup information reviewed  Ports checked                Scotty Lenz MD

## 2019-08-29 ENCOUNTER — APPOINTMENT (OUTPATIENT)
Dept: RADIATION THERAPY | Facility: OUTPATIENT CENTER | Age: 74
End: 2019-08-29
Payer: COMMERCIAL

## 2019-08-30 ENCOUNTER — APPOINTMENT (OUTPATIENT)
Dept: RADIATION THERAPY | Facility: OUTPATIENT CENTER | Age: 74
End: 2019-08-30
Payer: COMMERCIAL

## 2019-09-03 ENCOUNTER — APPOINTMENT (OUTPATIENT)
Dept: RADIATION THERAPY | Facility: OUTPATIENT CENTER | Age: 74
End: 2019-09-03
Payer: COMMERCIAL

## 2019-09-04 ENCOUNTER — APPOINTMENT (OUTPATIENT)
Dept: RADIATION THERAPY | Facility: OUTPATIENT CENTER | Age: 74
End: 2019-09-04
Payer: COMMERCIAL

## 2019-09-04 ENCOUNTER — OFFICE VISIT (OUTPATIENT)
Dept: RADIATION THERAPY | Facility: OUTPATIENT CENTER | Age: 74
End: 2019-09-04
Payer: COMMERCIAL

## 2019-09-04 VITALS
SYSTOLIC BLOOD PRESSURE: 138 MMHG | HEART RATE: 66 BPM | OXYGEN SATURATION: 100 % | DIASTOLIC BLOOD PRESSURE: 77 MMHG | RESPIRATION RATE: 16 BRPM | WEIGHT: 178.6 LBS

## 2019-09-04 DIAGNOSIS — D05.11 DUCTAL CARCINOMA IN SITU (DCIS) OF RIGHT BREAST: Primary | ICD-10-CM

## 2019-09-04 ASSESSMENT — PAIN SCALES - GENERAL: PAINLEVEL: NO PAIN (0)

## 2019-09-04 NOTE — LETTER
2019      RE: Arlin Hutchins  [address] [address] [address]       [facility] PHYSICIANS  SPECIALIZING IN BREAKTHROUGHS  Radiation Oncology    On Treatment Visit Note      Arlin Hutchins      Date: 2019   MRN: [id number]   : 1945         Reason for Visit:  On Radiation Treatment Visit     Treatment Summary to Date   Right breast   4605/ 5005 cGy             Subjective:   Doing well. No acute complaints. Using skin creams. Single episode of breast pain yesterday, since resolved. Mild pruritus controlled with kenalog.    Nursing ROS:           Objective:   There were no vitals taken for this visit.   Very mild skin erythema without desquamation.     Labs:  CBC RESULTS: No results for input(s): WBC, RBC, HGB, HCT, MCV, MCH, MCHC, RDW, PLT in the last 45344 hours.  ELECTROLYTES:  No results for input(s): NA, POTASSIUM, CHLORIDE, WILY, CO2, BUN, CR, GLC in the last 82177 hours.    Assessment:  Ms. Hutchins is a 73 year old female a diagnosis of left breast ductal carcinoma in situ status post breast conservation therapy now presenting with right breast ductal carcinoma in situ status post lumpectomy.  Final pathology demonstrated DCIS, grade 3, 2.5 cm in size, close anterior and medial margin (1 mm), ER negative, pathologic TisN0.  She is undergoing adjuvant right whole breast radiation therapy.    Tolerating radiation therapy well.  All questions and concerns addressed.    Plan:   1. Continue current therapy.  EOT Friday, RTC in 1 month with NP.  2. Continue skin care.   3. Ibuprofen PRN beast heaviness/pain. Neosporin with pain relief PRN pain. Hydrocortisone PRN pruritus.       [employer] chart and setup information reviewed  Ports checked           Scotty Lenz MD

## 2019-09-04 NOTE — PROGRESS NOTES
[facility] PHYSICIANS  SPECIALIZING IN BREAKTHROUGHS  Radiation Oncology    On Treatment Visit Note      Arlin Hutchins      Date: 2019   MRN: [id number]   : 1945         Reason for Visit:  On Radiation Treatment Visit     Treatment Summary to Date   Right breast   4605/ 5005 cGy             Subjective:   Doing well. No acute complaints. Using skin creams. Single episode of breast pain yesterday, since resolved. Mild pruritus controlled with kenalog.    Nursing ROS:           Objective:   There were no vitals taken for this visit.   Very mild skin erythema without desquamation.     Labs:  CBC RESULTS: No results for input(s): WBC, RBC, HGB, HCT, MCV, MCH, MCHC, RDW, PLT in the last 37639 hours.  ELECTROLYTES:  No results for input(s): NA, POTASSIUM, CHLORIDE, WILY, CO2, BUN, CR, GLC in the last 91230 hours.    Assessment:  Ms. Hutchins is a 73 year old female a diagnosis of left breast ductal carcinoma in situ status post breast conservation therapy now presenting with right breast ductal carcinoma in situ status post lumpectomy.  Final pathology demonstrated DCIS, grade 3, 2.5 cm in size, close anterior and medial margin (1 mm), ER negative, pathologic TisN0.  She is undergoing adjuvant right whole breast radiation therapy.    Tolerating radiation therapy well.  All questions and concerns addressed.    Plan:   1. Continue current therapy.  EOT Friday, RTC in 1 month with NP.  2. Continue skin care.   3. Ibuprofen PRN beast heaviness/pain. Neosporin with pain relief PRN pain. Hydrocortisone PRN pruritus.       Mosaiq chart and setup information reviewed  Ports checked                Scotty Lenz MD

## 2019-09-05 ENCOUNTER — APPOINTMENT (OUTPATIENT)
Dept: RADIATION THERAPY | Facility: OUTPATIENT CENTER | Age: 74
End: 2019-09-05
Payer: COMMERCIAL

## 2019-09-06 ENCOUNTER — APPOINTMENT (OUTPATIENT)
Dept: RADIATION THERAPY | Facility: OUTPATIENT CENTER | Age: 74
End: 2019-09-06
Payer: COMMERCIAL

## 2019-09-13 ENCOUNTER — DOCUMENTATION ONLY (OUTPATIENT)
Dept: RADIATION THERAPY | Facility: OUTPATIENT CENTER | Age: 74
End: 2019-09-13

## 2019-09-13 NOTE — PROGRESS NOTES
Department of Radiation Oncology  Radiation Therapy Center  [facility] [place] [address]  [phone]       Radiotherapy Treatment Summary          2019    PATIENT: Arlin Hutchins  MEDICAL RECORD NO: [id number]   : 1945    DIAGNOSIS: left breast ductal carcinoma in situ status post breast conservation therapy now presenting with right breast ductal carcinoma in situ status post lumpectomy.  INTENT OF RADIOTHERAPY: adjuvant right whole breast radiation therapy  PATHOLOGY: Final pathology demonstrated DCIS, grade 3, 2.5 cm in size, close anterior and medial margin (1 mm), ER negative                   STAGE: pathologic TisN0  CONCURRENT SYSTEMIC THERAPY: continued on Arimidex under the care of Dr. Monaco.      ONCOLOGIC HISTORY:    Ms. Hutchins is a 74 year old female a diagnosis of left breast ductal carcinoma in situ status post breast conservation therapy now presenting with right breast ductal carcinoma in situ status post lumpectomy.  Final pathology demonstrated DCIS, grade 3, 2.5 cm in size, close anterior and medial margin (1 mm), ER negative, pathologic TisN0.      The patient's history dates to  when she underwent lumpectomy of the left breast diagnosed ductal carcinoma in situ.  On 2017 final path from left breast lumpectomy demonstrated DCIS, grade 2, negative margins, no invasive disease, estrogen receptor positive.  The patient subsequent completed adjuvant whole breast radiation therapy under the care of Dr. Schumacher.  She received a total dose of 4256 cGy followed by 1000 cGy boost to the lumpectomy bed.  Treatment was completed on 2017.  The patient was seen by Dr. Monaco of medical oncology who continued anti-endocrine therapy with Arimidex.  More recently the patient underwent mammogram which demonstrated calcifications in the right breast prompting further evaluation.  On 2019 left breast biopsy of the area of calcifications at the 12 o'clock  position, 5 cm from the nipple demonstrated DCIS, grade 3, ER negative.  Biopsy of an additional site at the 12 o'clock position, 9 cm from the nipple demonstrated DCIS grade 3, no invasive disease.  On 6/27/2019 the patient underwent right breast lumpectomy by Dr. Moody.  Final pathology demonstrated DCIS, grade 3, 2.5 cm in size, close anterior/medial margin (1 mm), ER negative, no invasive disease, pathologic Tis N0.  Reeexcision was discussed, but ultimately the patient chose not to undergo re-excision. The patient subsequently presented in Radiation Oncology in consultation  to discuss the potential role of whole breast radiation therapy.      She has continued on Arimidex under the care of Dr. Monaco.      SITE OF TREATMENT: right breast    DATES  OF TREATMENT: 8/8/19-9/6/19    TOTAL DOSE OF TREATMENT: 5005 cGy    DOSE PER FRACTION OF TREATMENT: 267 cGy x 15 fractions + 200 cGy x 5 fractions       COMMENT/TOXICITY:   No acute complaints. Using skin creams. Single episode of breast pain yesterday, since resolved. Mild pruritus controlled with kenalog.  Very mild skin erythema without desquamation.              PAIN MANAGEMENT:    Ibuprofen PRN beast heaviness/pain. Neosporin with pain relief PRN pain                      FOLLOW UP PLAN:  1. RTC in 1 month with NP.  2. Continue skin care.   3. Hydrocortisone PRN pruritus.     Radiation Oncologist: Scotty Lenz M.D.  Department of Radiation Oncology  [facility]

## 2019-10-01 NOTE — PROGRESS NOTES
Department of Radiation Oncology  Radiation Therapy Center  [facility] [place] [address]  [phone]         Radiation Oncology Follow-up Visit  10/04/2019      Arlin Hutchins  MRN: [id number]   : 1945     DIAGNOSIS: left breast ductal carcinoma in situ status post breast conservation therapy now presenting with right breast ductal carcinoma in situ status post lumpectomy.  INTENT OF RADIOTHERAPY: adjuvant right whole breast radiation therapy  PATHOLOGY: Final pathology demonstrated DCIS, grade 3, 2.5 cm in size, close anterior and medial margin (1 mm), ER negative                     STAGE: pathologic TisN0  CONCURRENT SYSTEMIC THERAPY: continues on Arimidex under the care of Dr. Monaco (history Left breast DCIS ER+ )     ONCOLOGIC HISTORY:    Ms. Hutchins is a 74 year old female a diagnosis of left breast ductal carcinoma in situ status post breast conservation therapy now presenting with right breast ductal carcinoma in situ status post lumpectomy.  Final pathology demonstrated DCIS, grade 3, 2.5 cm in size, close anterior and medial margin (1 mm), ER negative, pathologic TisN0.      The patient's history dates to  when she underwent lumpectomy of the left breast diagnosed ductal carcinoma in situ.  On 2017 final path from left breast lumpectomy demonstrated DCIS, grade 2, negative margins, no invasive disease, estrogen receptor positive.  The patient subsequent completed adjuvant whole breast radiation therapy under the care of Dr. Schumacher.  She received a total dose of 4256 cGy followed by 1000 cGy boost to the lumpectomy bed.  Treatment was completed on 2017. The patient was seen by Dr. Monaco of medical oncology who continued anti-endocrine therapy with Arimidex.  More recently the patient underwent mammogram which demonstrated calcifications in the right breast prompting further evaluation. On 2019 left breast biopsy of the area of calcifications at the 12  o'clock position, 5 cm from the nipple demonstrated DCIS, grade 3, ER negative.  Biopsy of an additional site at the 12 o'clock position, 9 cm from the nipple demonstrated DCIS grade 3, no invasive disease.  On 2019 the patient underwent right breast lumpectomy by Dr. Moody.  Final pathology demonstrated DCIS, grade 3, 2.5 cm in size, close anterior/medial margin (1 mm), ER negative, no invasive disease, pathologic Tis N0.  Reeexcision was discussed, but ultimately the patient chose not to undergo re-excision. The patient subsequently presented in Radiation Oncology in consultation  to discuss the potential role of whole breast radiation therapy.      She has continued on Arimidex under the care of Dr. Monaco (history Left breast DCIS ER+ )     SITE OF TREATMENT: right breast     DATES  OF TREATMENT: 19-19     TOTAL DOSE OF TREATMENT: 5005 cGy     DOSE PER FRACTION OF TREATMENT: 267 cGy x 15 fractions + 200 cGy x 5 fractions       INTERVAL SINCE COMPLETION OF RADIATION THERAPY:   1 month    SUBJECTIVE:   Arlin Hutchins is a 74 year old female who is here today for routine 1 month follow up after completing radiation therapy.  She has seen healing of her skin reaction.  She continues to moisturize BID .  Denies any cough or shortness of breath related to possible risk of interstitial pneumonitis. Good ROM of arms and shoulders bilaterally. She also has had resolution of her fatigue.  No pain.     Arimidex: tolerating well with minimal hot flashes.     ROS otherwise negative on a 12-system review.  ECO       Current Outpatient Medications   Medication     anastrozole (ARIMIDEX) 1 MG tablet     calcipotriene (DOVONEX) 0.005 % external cream     hydrocortisone (ANUSOL-HC) 2.5 % cream     losartan (COZAAR) 50 MG tablet     No current facility-administered medications for this visit.           Allergies   Allergen Reactions     Penicillins Anaphylaxis     Throat closes     Diatrizoate Hives      Hydrochlorothiazide Other (See Comments)     Severe headache on 25mg tab hydrochlorothiazide      Shellfish Allergy Nausea     Clams only     Sulfa Drugs Rash       Past Medical History:   Diagnosis Date     Cancer (H)      Hypertension          PHYSICAL EXAM:  BP (!) 142/73   Pulse 65   Resp 18   SpO2 100%   General: in no acute distress, alert and oriented x3.   HEENT: Normocephalic, atraumatic.  PERRLA, oropharynx clear with no mucositis or thrush.   Lymph Nodes: No palpable pre/post-auricular, cervical, or axillary lymphadenopathy appreciated.   CV: RRR, normal S1 S2.  No murmurs, gallops, or rubs.   Lungs: Clear to auscultation bilaterally.  No dullness to percussion.   Abdomen:  Soft and non tender. No palpable masses.    Extremities: no clubbing, cyanosis, or edema.   Neurologic: Alert and oriented x3. Cranial nerves II-XII grossly intact.   Breast: minimal erythema to right breast consistent with radiation changes. Skin intact. Well healed incisions. No concerning lumps or masses bilaterally. No nipple discharge.     LABS AND IMAGING:  none    IMPRESSION:   Ms. Hutchins is a 74 year old female with a diagnosis of left breast ER+ ductal carcinoma in situ status post breast conservation therapy (2017) now presenting with right breast ductal carcinoma in situ status post lumpectomy.  Final pathology demonstrated DCIS, grade 3, 2.5 cm in size, close anterior and medial margin (1 mm), ER negative, pathologic TisN0. Completed adjuvant right whole breast radiation therapy 5005 cGy from  8/8/19-9/6/19.    She has continued on Arimidex under the care of Dr. Monaco (history Left breast DCIS ER+ 2017)    PLAN:   Acute toxicities from RT improving. Has mild residual erythema which will continue to improve with time. Discussed long term care with continued moisturizing of the treated breast. Discussed possibility of scar formation from radiation and treatment with gentle massage. She will follow up here 6 months with   Yoanna (alternate).     Patient will follow up with medical oncology for continued care and imaging (Dr. Monaco). 6 mths follow-up with breast surgeon (Dr. Moody) According to NCCN guidelines, follow-up of breast cancer should include history and physical examination with emphasis on breast examination every 4-12 months for 5 years as clinically appropriate then annually thereafter. Last screening bilateral mammogram was 4/29/19. Emphasis is also on continuing with annual mammography.     Endocrine therapy. Continues to be adherent to adjuvant endocrine therapy. Tolerating well.     Genetics.  She had genetics counseling 6/2019 and was negative.  Recommendation for period screening for changes in family history.    Cancer screening.  Should undergo routine screening for age group. She should limit her sun exposure and use sunscreens. Reports having  IFOT test yearly (negative 4/2/19).    Healthy lifestyle.  She should continue to refrain from tobacco. She will continue to see her primary care provider for general health maintenance. She should minimize alcohol intake. If continuing to drink, should follow CDC recommendations for no more than 1 alcoholic drink/day for women.    osteoporosis. She is post menopausal. DEXA 5/2017 showing osteoporosis. Next scheduled in 11/2019. Getting infusion for  osteoporosis treatment with medical oncology. Was recommended to perform weightbearing exercises 2-3 days per week, and to supplement with 1200 mg of calcium, 1000 international unites of vitamin D daily.       Paola Perez [facility]  Radiation Therapy Center  [facility]  [address], [address]  [place] [address]     CC:  Patient Care Team:  Debbie Ramos MD as PCP - General (Family Practice)  Selena Moody MD Sood, Sumit Sean, MD as MD (Radiation Oncology)  Paola Perez APRN CNP as Nurse Practitioner (Nurse Practitioner)  Maegan Monaco MD as MD (Hematology & Oncology)

## 2019-10-04 ENCOUNTER — OFFICE VISIT (OUTPATIENT)
Dept: RADIATION THERAPY | Facility: OUTPATIENT CENTER | Age: 74
End: 2019-10-04
Payer: COMMERCIAL

## 2019-10-04 VITALS
DIASTOLIC BLOOD PRESSURE: 73 MMHG | RESPIRATION RATE: 18 BRPM | SYSTOLIC BLOOD PRESSURE: 142 MMHG | HEART RATE: 65 BPM | OXYGEN SATURATION: 100 %

## 2019-10-04 DIAGNOSIS — D05.11 DUCTAL CARCINOMA IN SITU (DCIS) OF RIGHT BREAST: Primary | ICD-10-CM

## 2019-10-04 ASSESSMENT — PAIN SCALES - GENERAL: PAINLEVEL: NO PAIN (0)

## 2019-10-04 NOTE — LETTER
10/4/2019      RE: Arlin Hutchins  [address] [facility] [address]          Department of Radiation Oncology  Radiation Therapy Center  [facility] [place] [address]  [phone]         Radiation Oncology Follow-up Visit  10/04/2019      Arlin Hutchins  MRN: [id number]   : 1945     DIAGNOSIS: left breast ductal carcinoma in situ status post breast conservation therapy now presenting with right breast ductal carcinoma in situ status post lumpectomy.  INTENT OF RADIOTHERAPY: adjuvant right whole breast radiation therapy  PATHOLOGY: Final pathology demonstrated DCIS, grade 3, 2.5 cm in size, close anterior and medial margin (1 mm), ER negative                     STAGE: pathologic TisN0  CONCURRENT SYSTEMIC THERAPY: continues on Arimidex under the care of Dr. Monaco (history Left breast DCIS ER+ )     ONCOLOGIC HISTORY:    Ms. Hutchins is a 74 year old female a diagnosis of left breast ductal carcinoma in situ status post breast conservation therapy now presenting with right breast ductal carcinoma in situ status post lumpectomy.  Final pathology demonstrated DCIS, grade 3, 2.5 cm in size, close anterior and medial margin (1 mm), ER negative, pathologic TisN0.      The patient's history dates to  when she underwent lumpectomy of the left breast diagnosed ductal carcinoma in situ.  On 2017 final path from left breast lumpectomy demonstrated DCIS, grade 2, negative margins, no invasive disease, estrogen receptor positive.  The patient subsequent completed adjuvant whole breast radiation therapy under the care of Dr. Schumacher.  She received a total dose of 4256 cGy followed by 1000 cGy boost to the lumpectomy bed.  Treatment was completed on 2017. The patient was seen by Dr. Monaco of medical oncology who continued anti-endocrine therapy with Arimidex.  More recently the patient underwent mammogram which demonstrated calcifications in the right breast prompting further  evaluation. On 2019 left breast biopsy of the area of calcifications at the 12 o'clock position, 5 cm from the nipple demonstrated DCIS, grade 3, ER negative.  Biopsy of an additional site at the 12 o'clock position, 9 cm from the nipple demonstrated DCIS grade 3, no invasive disease.  On 2019 the patient underwent right breast lumpectomy by Dr. Moody.  Final pathology demonstrated DCIS, grade 3, 2.5 cm in size, close anterior/medial margin (1 mm), ER negative, no invasive disease, pathologic Tis N0.  Reeexcision was discussed, but ultimately the patient chose not to undergo re-excision. The patient subsequently presented in Radiation Oncology in consultation  to discuss the potential role of whole breast radiation therapy.      She has continued on Arimidex under the care of Dr. Monaco (history Left breast DCIS ER+ )     SITE OF TREATMENT: right breast     DATES  OF TREATMENT: 19-19     TOTAL DOSE OF TREATMENT: 5005 cGy     DOSE PER FRACTION OF TREATMENT: 267 cGy x 15 fractions + 200 cGy x 5 fractions       INTERVAL SINCE COMPLETION OF RADIATION THERAPY:   1 month    SUBJECTIVE:   Arlin Hutchins is a 74 year old female who is here today for routine 1 month follow up after completing radiation therapy.  She has seen healing of her skin reaction.  She continues to moisturize BID .  Denies any cough or shortness of breath related to possible risk of interstitial pneumonitis. Good ROM of arms and shoulders bilaterally. She also has had resolution of her fatigue.  No pain.     Arimidex: tolerating well with minimal hot flashes.     ROS otherwise negative on a 12-system review.  ECO       Current Outpatient Medications   Medication     anastrozole (ARIMIDEX) 1 MG tablet     calcipotriene (DOVONEX) 0.005 % external cream     hydrocortisone (ANUSOL-HC) 2.5 % cream     losartan (COZAAR) 50 MG tablet     No current facility-administered medications for this visit.           Allergies   Allergen  Reactions     Penicillins Anaphylaxis     Throat closes     Diatrizoate Hives     Hydrochlorothiazide Other (See Comments)     Severe headache on 25mg tab hydrochlorothiazide      Shellfish Allergy Nausea     Clams only     Sulfa Drugs Rash       Past Medical History:   Diagnosis Date     Cancer (H)      Hypertension          PHYSICAL EXAM:  BP (!) 142/73   Pulse 65   Resp 18   SpO2 100%   General: in no acute distress, alert and oriented x3.   HEENT: Normocephalic, atraumatic.  PERRLA, oropharynx clear with no mucositis or thrush.   Lymph Nodes: No palpable pre/post-auricular, cervical, or axillary lymphadenopathy appreciated.   CV: RRR, normal S1 S2.  No murmurs, gallops, or rubs.   Lungs: Clear to auscultation bilaterally.  No dullness to percussion.   Abdomen:  Soft and non tender. No palpable masses.    Extremities: no clubbing, cyanosis, or edema.   Neurologic: Alert and oriented x3. Cranial nerves II-XII grossly intact.   Breast: minimal erythema to right breast consistent with radiation changes. Skin intact. Well healed incisions. No concerning lumps or masses bilaterally. No nipple discharge.     LABS AND IMAGING:  none    IMPRESSION:   Ms. Hutchins is a 74 year old female with a diagnosis of left breast ER+ ductal carcinoma in situ status post breast conservation therapy (2017) now presenting with right breast ductal carcinoma in situ status post lumpectomy.  Final pathology demonstrated DCIS, grade 3, 2.5 cm in size, close anterior and medial margin (1 mm), ER negative, pathologic TisN0. Completed adjuvant right whole breast radiation therapy 5005 cGy from  8/8/19-9/6/19 .    She has continued on Arimidex under the care of Dr. Monaco (history Left breast DCIS ER+ 2017)    PLAN:   Acute toxicities from RT improving. Has mild residual erythema which will continue to improve with time. Discussed long term care with continued moisturizing of the treated breast. Discussed possibility of scar formation from  radiation and treatment with gentle massage. She will follow up here 6 months with Dr. Lenz (alternate).     Patient will follow up with medical oncology for continued care and imaging (Dr. Monaco). 6 mths follow-up with breast surgeon (Dr. Moody) According to NCCN guidelines, follow-up of breast cancer should include history and physical examination with emphasis on breast examination every 4-12 months for 5 years as clinically appropriate then annually thereafter. Last screening bilateral mammogram was 4/29/19. Emphasis is also on continuing with annual mammography.     Endocrine therapy. Continues to be adherent to adjuvant endocrine therapy. Tolerating well.     Genetics.  She had genetics counseling 6/2019 and was negative.  Recommendation for period screening for changes in family history.    Cancer screening.   Should undergo routine screening for age group. She should limit her sun exposure and use sunscreens. Reports having  IFOT test yearly (negative 4/2/19).    Healthy lifestyle.  She should continue to refrain from tobacco. She will continue to see her primary care provider for general health maintenance. She should minimize alcohol intake. If continuing to drink, should follow CDC recommendations for no more than 1 alcoholic drink/day for women.    osteoporosis. She is post menopausal. DEXA 5/2017 showing osteoporosis. Next scheduled in 11/2019. Getting infusion for  osteoporosis treatment with medical oncology. Was recommended to perform weightbearing exercises 2-3 days per week, and to supplement with 1200 mg of calcium, 1000 international unites of vitamin D daily.       Paola Perez [facility]  Radiation Therapy Center  [facility]  [address], [address]  [place] [address]     CC:  Patient Care Team:  Debbie Ramos MD as PCP - General (Family Practice)  Selena Moody MD Sood, Sumit Sean, MD as MD (Radiation Oncology)  Paola Perez APRN CNP as Nurse Practitioner (Nurse  Practitioner)  Maegan Monaco MD as MD (Hematology & Oncology)      COLIN Brar CNP

## 2019-10-04 NOTE — NURSING NOTE
FOLLOW-UP VISIT    Patient Name: Arlin Hutchins      : 1945     Age: 74 year old        ______________________________________________________________________________     Chief Complaint   Patient presents with     Radiation Therapy     follow up     BP (!) 142/73   Pulse 65   Resp 18   SpO2 100%      Date Radiation Completed: 19 L breast    Pain  Denies    Meds  Current Med List Reviewed: Yes  Medication Note:     Skin: Warm  Dry  Intact  continues to use lotions to L breast    Range of Motion: adequate, no complaints    Respiratory: No shortness of breath, dyspnea on exertion, cough, or hemoptysis    Hormone Therapy: Yes arimidex    Lymphedema Follow up: No    Energy Level: normal      Appointments:     DATE  Oncologist: Dr. Monaco  was seen recently   Surgeon:Dr. Moody  2020   Primary:      Other Notes:

## 2020-01-24 PROBLEM — D05.11 DUCTAL CARCINOMA IN SITU (DCIS) OF RIGHT BREAST: Status: ACTIVE | Noted: 2019-05-16

## 2020-04-02 ENCOUNTER — VIRTUAL VISIT (OUTPATIENT)
Dept: RADIATION THERAPY | Facility: OUTPATIENT CENTER | Age: 75
End: 2020-04-02
Payer: COMMERCIAL

## 2020-04-02 DIAGNOSIS — D05.11 DUCTAL CARCINOMA IN SITU (DCIS) OF RIGHT BREAST: Primary | ICD-10-CM

## 2020-04-02 NOTE — PROGRESS NOTES
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  INTENT OF RADIOTHERAPY: adjuvant right whole breast radiation therapy  PATHOLOGY: Final pathology demonstrated DCIS, grade 3, 2.5 cm in size, close anterior and medial margin (1 mm), ER negative                     STAGE: pathologic TisN0  CONCURRENT SYSTEMIC THERAPY: continues on Arimidex under the care of Dr. Monaco (history Left breast DCIS ER+ )     ONCOLOGIC HISTORY:    Ms. Hutchins is a 74 year old female a diagnosis of left breast ductal carcinoma in situ status post breast conservation therapy now presenting with right breast ductal carcinoma in situ status post lumpectomy.  Final pathology demonstrated DCIS, grade 3, 2.5 cm in size, close anterior and medial margin (1 mm), ER negative, pathologic TisN0.      The patient's history dates to  when she underwent lumpectomy of the left breast diagnosed ductal carcinoma in situ.  On 2017 final path from left breast lumpectomy demonstrated DCIS, grade 2, negative margins, no invasive disease, estrogen receptor positive.  The patient subsequent completed adjuvant whole breast radiation therapy under the care of Dr. Schumacher.  She received a total dose of 4256 cGy followed by 1000 cGy boost to the lumpectomy bed.  Treatment was completed on 2017. The patient was seen by Dr. Monaco of medical oncology who continued anti-endocrine therapy with Arimidex.  More recently the patient underwent mammogram which demonstrated calcifications in the right breast prompting further evaluation. On 2019 left breast biopsy of the area of calcifications at the 12  o'clock position, 5 cm from the nipple demonstrated DCIS, grade 3, ER negative.  Biopsy of an additional site at the 12 o'clock position, 9 cm from the nipple demonstrated DCIS grade 3, no invasive disease.  On 2019 the patient underwent right breast lumpectomy by Dr. Moody.  Final pathology demonstrated DCIS, grade 3, 2.5 cm in size, close anterior/medial margin (1 mm), ER negative, no invasive disease, pathologic Tis N0.  Reeexcision was discussed, but ultimately the patient chose not to undergo re-excision. The patient subsequently presented in Radiation Oncology in consultation  to discuss the potential role of whole breast radiation therapy.      She has continued on Arimidex under the care of Dr. Monaco (history Left breast DCIS ER+ )     SITE OF TREATMENT: right breast     DATES  OF TREATMENT: 19-19     TOTAL DOSE OF TREATMENT: 5005 cGy     DOSE PER FRACTION OF TREATMENT: 267 cGy x 15 fractions + 200 cGy x 5 fractions       INTERVAL SINCE COMPLETION OF RADIATION THERAPY:   7 months    SUBJECTIVE:     Due to the concerns around COVID-19 and adhering to social distancing we conduct this visit over the telephone.     Mammogram in 2019 was CRYSTAL.     She overall feels very well. No breast pain. No new breast masses.  Denies any cough or shortness of breath related to possible risk of interstitial pneumonitis. Good ROM of arms and shoulders bilaterally. She also has had resolution of her fatigue.     Arimidex: tolerating well with minimal hot flashes.      LABS AND IMAGIN19   Mammogram    No evidence for malignancy.  Recommend annual screening   mammography, due in 2020.      IMPRESSION:   Ms. Hutchins is a 74 year old female with a diagnosis of left breast ER+ ductal carcinoma in situ status post breast conservation therapy () now presenting with right breast ductal carcinoma in situ status post lumpectomy.  Final pathology demonstrated DCIS, grade 3, 2.5 cm in size,  close anterior and medial margin (1 mm), ER negative, pathologic TisN0. Completed adjuvant right whole breast radiation therapy 5005 cGy from  8/8/19-9/6/19.    She has continued on Arimidex under the care of Dr. Monaco (history Left breast DCIS ER+ 2017)    PLAN:   1. Mammogram on 12/20/19 was CRYSTAL.     2. Discussed long term care with continued moisturizing of the treated breast. Discussed possibility of scar formation from radiation and treatment with gentle massage.    3. RTC in 3 months.     4. Continue follow up with medical oncology. Defer imaging to medical oncology team.      Due to the concerns around COVID-19 and adhering to social distancing we conduct this visit over the telephone. Telephone call lasted 15 minutes.      Scotty Lenz M.D.  Department of Radiation Oncology  [facility]

## 2020-04-02 NOTE — PROGRESS NOTES
"Subjective     Arlin Hutchins is a 74 year old female who is being evaluated via a billable telephone visit.      The patient has been notified of following:     \"This telephone visit will be conducted via a call between you and your physician/provider. We have found that certain health care needs can be provided without the need for a physical exam.  This service lets us provide the care you need with a short phone conversation.  If a prescription is necessary we can send it directly to your pharmacy.  If lab work is needed we can place an order for that and you can then stop by our lab to have the test done at a later time.    If during the course of the call the physician/provider feels a telephone visit is not appropriate, you will not be charged for this service.\"     Patient has given verbal consent for Telephone visit?  Yes    Arlin Hutchins complains of   Chief Complaint   Patient presents with     Radiation Therapy     Follow up phone visit, breast cancer       ALLERGIES  Penicillins; Diatrizoate; Hydrochlorothiazide; Shellfish allergy; and Sulfa drugs    Breast cancer, s/p radiation therapy.      Assessment/Plan:  Patient has been doing well, no new health issues to report. Has lost 15 lbs with weight watchers, has reduced blood pressure medication to once daily. No issues with ROM or lymphedema, does home massage at scar area. Sees med onc Dr. Monaco regularly, has upcoming appointment in April after next mammogram. Tolerating arimidex.     Phone call duration:  10 minutes    Keisha Coulter RN    "

## 2020-06-26 ENCOUNTER — TELEPHONE (OUTPATIENT)
Dept: RADIATION THERAPY | Facility: OUTPATIENT CENTER | Age: 75
End: 2020-06-26

## 2020-06-26 NOTE — TELEPHONE ENCOUNTER
Arlin called to cancel 7/2 follow up. She just had a phone visit 4/2020 and states she is feeling really great with no concerns or complaints.   She is scheduled for mammogram and to see her medical oncologist.  Arlin will continue with med onc.  She would like to be PRN here. She can call if concerns arise.

## 2021-10-23 ENCOUNTER — OFFICE VISIT (OUTPATIENT)
Dept: URGENT CARE | Facility: URGENT CARE | Age: 76
End: 2021-10-23
Payer: COMMERCIAL

## 2021-10-23 VITALS
WEIGHT: 176 LBS | HEART RATE: 89 BPM | DIASTOLIC BLOOD PRESSURE: 82 MMHG | TEMPERATURE: 97.9 F | OXYGEN SATURATION: 97 % | RESPIRATION RATE: 16 BRPM | SYSTOLIC BLOOD PRESSURE: 120 MMHG

## 2021-10-23 DIAGNOSIS — H61.21 IMPACTED CERUMEN OF RIGHT EAR: ICD-10-CM

## 2021-10-23 DIAGNOSIS — H69.91 DYSFUNCTION OF RIGHT EUSTACHIAN TUBE: Primary | ICD-10-CM

## 2021-10-23 PROCEDURE — 69209 REMOVE IMPACTED EAR WAX UNI: CPT | Mod: RT | Performed by: PHYSICIAN ASSISTANT

## 2021-10-23 PROCEDURE — 99203 OFFICE O/P NEW LOW 30 MIN: CPT | Mod: 25 | Performed by: PHYSICIAN ASSISTANT

## 2021-10-23 NOTE — PROGRESS NOTES
Assessment & Plan     1. Dysfunction of right eustachian tube  Cerumen cleared. Patient still symptomatic. Pressure could equalized after flushing wax. If symptoms continue consider starting flonase 1 spray each nostril two times daily and antihistamine daily. Return to clinic if symptoms worsen or do not improve; otherwise follow up as needed      2. Impacted cerumen of right ear    - AR REMOVAL IMPACTED CERUMEN IRRIGATION/LVG UNILAT                  Return in about 1 week (around 10/30/2021), or if symptoms worsen or fail to improve.    [facility] [place]              Subjective   Chief Complaint   Patient presents with     Ear Problem     rt ear pain and feels swollen - was told had minimun wax 3 weeks ago         HPI     Ear problem     Onset of symptoms was 3 week(s) ago.  Course of illness is same.    Severity mild  Current and Associated symptoms: ear pressure   Treatment measures tried include none.  Predisposing factors include had sma.                Review of Systems   Constitutional, HEENT, cardiovascular, pulmonary, gi and gu systems are negative, except as otherwise noted.      Objective    /82 (BP Location: Right arm)   Pulse 89   Temp 97.9  F (36.6  C) (Tympanic)   Resp 16   Wt 79.8 kg (176 lb)   SpO2 97%   There is no height or weight on file to calculate BMI.     Physical Exam  Constitutional:       Appearance: She is well-developed.   HENT:      Head: Normocephalic.      Left Ear: Tympanic membrane and ear canal normal.      Ears:      Comments: Cerumen impaction of right canal. Ear lavage performed and canal cleared. Right TM is gray and intact. Patient still has pressure/plugged feeling.   Eyes:      Conjunctiva/sclera: Conjunctivae normal.   Cardiovascular:      Rate and Rhythm: Normal rate.      Heart sounds: Normal heart sounds.   Pulmonary:      Effort: Pulmonary effort is normal.      Breath sounds: Normal breath sounds.   Skin:      General: Skin is warm and dry.      Findings: No rash.   Psychiatric:         Behavior: Behavior normal.

## 2023-12-18 ENCOUNTER — OFFICE VISIT (OUTPATIENT)
Dept: URGENT CARE | Facility: URGENT CARE | Age: 78
End: 2023-12-18
Payer: COMMERCIAL

## 2023-12-18 ENCOUNTER — TELEPHONE (OUTPATIENT)
Dept: URGENT CARE | Facility: URGENT CARE | Age: 78
End: 2023-12-18

## 2023-12-18 VITALS
TEMPERATURE: 98 F | HEART RATE: 74 BPM | OXYGEN SATURATION: 98 % | DIASTOLIC BLOOD PRESSURE: 90 MMHG | RESPIRATION RATE: 18 BRPM | WEIGHT: 182 LBS | SYSTOLIC BLOOD PRESSURE: 154 MMHG

## 2023-12-18 DIAGNOSIS — R05.1 ACUTE COUGH: Primary | ICD-10-CM

## 2023-12-18 LAB
FLUAV RNA SPEC QL NAA+PROBE: NEGATIVE
FLUBV RNA RESP QL NAA+PROBE: NEGATIVE
RSV RNA SPEC NAA+PROBE: POSITIVE
SARS-COV-2 RNA RESP QL NAA+PROBE: NEGATIVE

## 2023-12-18 PROCEDURE — 99213 OFFICE O/P EST LOW 20 MIN: CPT | Performed by: NURSE PRACTITIONER

## 2023-12-18 PROCEDURE — 87637 SARSCOV2&INF A&B&RSV AMP PRB: CPT | Performed by: NURSE PRACTITIONER

## 2023-12-18 NOTE — PROGRESS NOTES
SUBJECTIVE:   Arlin Hutchins is a 78 year old female presenting with a chief complaint of   Chief Complaint   Patient presents with    Cough     Watched 2 grand kids on Friday, tested positive on Sunday with rsv, woke up on Saturday with cold.    Negative home covid test.  Runny nose, cough, itchy throat on right side.    Past Medical History:   Diagnosis Date    Cancer (H)     Hypertension      No family history on file.  Current Outpatient Medications   Medication Sig Dispense Refill    calcipotriene (DOVONEX) 0.005 % external cream Apply twice daily on Monday-Friday      hydrocortisone (ANUSOL-HC) 2.5 % cream       losartan (COZAAR) 50 MG tablet Take 50 mg by mouth daily       anastrozole (ARIMIDEX) 1 MG tablet Take by mouth daily (Patient not taking: Reported on 12/18/2023)       Social History     Tobacco Use    Smoking status: Never    Smokeless tobacco: Never   Substance Use Topics    Alcohol use: Not on file       OBJECTIVE  BP (!) 154/90   Pulse 74   Temp 98  F (36.7  C) (Tympanic)   Resp 18   Wt 82.6 kg (182 lb)   SpO2 98%     Physical Exam  Constitutional:       Appearance: Normal appearance.   HENT:      Mouth/Throat:      Mouth: Mucous membranes are moist.   Eyes:      Extraocular Movements: Extraocular movements intact.      Conjunctiva/sclera: Conjunctivae normal.   Cardiovascular:      Rate and Rhythm: Normal rate and regular rhythm.      Heart sounds: Normal heart sounds.   Pulmonary:      Effort: Pulmonary effort is normal.      Breath sounds: Normal breath sounds.   Skin:     General: Skin is warm and dry.   Neurological:      General: No focal deficit present.      Mental Status: She is alert.   Psychiatric:         Mood and Affect: Mood normal.       RSV/Covid/influenza: pending    ASSESSMENT:  1. Acute cough    - Symptomatic Influenza A/B, RSV, & SARS-CoV2 PCR (COVID-19) Nasopharyngeal      PLAN:  1) Increase fluids and rest  2) Try Mucinex and Neti pot over the counter for congestion  3)  Continue taking Tylenol/Ibuprofen for fever/pain relief as needed.  4) Salt water gargles and lozenges can be helpful for throat relief  5) You will only be notified of the confirmatory results if they are positive.

## 2023-12-18 NOTE — TELEPHONE ENCOUNTER
General Call    Contacts         Type Contact Phone/Fax    12/18/2023 02:20 PM CST Phone (Incoming) Arlin Hutchins (Self) [phone] (H)          Reason for Call:  Patient states she gave the wrong phone number, and would like for all test results to be called to [phone]    What are your questions or concerns:  none    Date of last appointment with provider: 12/18/2023    Okay to leave a detailed message?: Yes at Other phone number:  [phone]*

## 2023-12-18 NOTE — PATIENT INSTRUCTIONS
1) Increase fluids and rest  2) Try Mucinex and Neti pot over the counter for congestion  3) Continue taking Tylenol/Ibuprofen for fever/pain relief as needed.  4) Salt water gargles and lozenges can be helpful for throat relief  5) You will only be notified of the confirmatory results if they are positive.

## 2023-12-19 ENCOUNTER — TELEPHONE (OUTPATIENT)
Dept: NURSING | Facility: CLINIC | Age: 78
End: 2023-12-19
Payer: COMMERCIAL

## 2023-12-19 NOTE — TELEPHONE ENCOUNTER
Patient seen in  yesterday and calling for test results.              Component  Ref Range & Units 1 d ago    Influenza A PCR  Negative Negative    Influenza B PCR  Negative Negative    RSV PCR  Negative Positive Abnormal     SARS CoV2 PCR  Negative Negative        Patient informed of above results.   No further requests.   Ericka Fischer RN   12/19/23 11:16 AM  [facility] Nurse Advisor